# Patient Record
(demographics unavailable — no encounter records)

---

## 2024-10-18 NOTE — ADDENDUM
[FreeTextEntry1] : Entered by Jaron Sherman, acting as scribe for Dr. Dash Bradley. The documentation recorded by the scribe accurately reflects the service I personally performed and the decisions made by me.

## 2024-10-18 NOTE — LETTER BODY
[FreeTextEntry1] : Fabio Baldwin -[address], [place] [address] [phone] (W) [phone] (F)  Dear Dr. Baldwin,      Reason for visit: Prostate cancer.     This is a 66 year old gentleman with elevated PSA, status post prostate biopsy. Patient obtained a positive prostate MRI which demonstrated a PI-RADS 3 MRI lesion. His current PSA is 4.23. Patient underwent subsequent fusion biopsy. The patient returns today to discuss the results of the biopsy. He denies any side effects or difficulties from the prostate biopsy. He denies any fevers or chills. He denies any pain. Patient denies any changes in health. The past medical history and family history and social history are unchanged. All other review of systems are negative. Patient denies any changes in medications. Medication list was reconciled.     On examination, the patient is a healthy-appearing gentleman in no acute distress. He is alert and oriented follows commands. He has normal mood and affect. He is normocephalic. Neck is supple. Oral no thrush Respirations are unlabored. His abdomen is soft and nontender. Bladder is nonpalpable. No CVA tenderness. Neurologically he is grossly intact. No peripheral edema. Skin without gross abnormality.      Prostate biopsy demonstrated evidence of Dedra 6 and 7 adenocarcinoma of the prostate involving 11 of 15 cores.     Assessment: Prostate cancer. Dedra 6 and 7 prostate cancer.     I counseled the patient on its clinical significance. I discussed the risk of metastatic disease and the probability of organ confined disease. I discussed the treatment options available to him including expectant management, hormonal therapy, radiation, and surgery. The risks and benefits of each options were discussed in detail as well quality of life issues. I answered his questions. He wishes to consider his options. Risks and benefits were discussed. Alternatives were given. I answered the patient questions. The patient wishes to consider the procedure and discuss with his family. The patient will follow-up as directed and will contact me with any questions or concerns or he has made a decision regarding the treatment of his prostate cancer.     Plan: Consider options. Followup in 1 month.